# Patient Record
Sex: FEMALE | URBAN - METROPOLITAN AREA
[De-identification: names, ages, dates, MRNs, and addresses within clinical notes are randomized per-mention and may not be internally consistent; named-entity substitution may affect disease eponyms.]

---

## 2020-11-18 ENCOUNTER — APPOINTMENT (RX ONLY)
Dept: URBAN - METROPOLITAN AREA CLINIC 34 | Facility: CLINIC | Age: 35
Setting detail: DERMATOLOGY
End: 2020-11-18

## 2020-11-18 DIAGNOSIS — L71.8 OTHER ROSACEA: ICD-10-CM

## 2020-11-18 PROCEDURE — ? COUNSELING

## 2020-11-18 PROCEDURE — 99202 OFFICE O/P NEW SF 15 MIN: CPT

## 2020-11-18 PROCEDURE — ? TREATMENT REGIMEN

## 2020-11-18 PROCEDURE — ? ADDITIONAL NOTES

## 2020-11-18 PROCEDURE — ? PRESCRIPTION

## 2020-11-18 RX ORDER — IVERMECTIN 10 MG/G
CREAM TOPICAL
Qty: 1 | Refills: 2 | Status: ERX | COMMUNITY
Start: 2020-11-18

## 2020-11-18 RX ORDER — DOXYCYCLINE 40 MG/1
CAPSULE ORAL
Qty: 30 | Refills: 1 | Status: ERX | COMMUNITY
Start: 2020-11-18

## 2020-11-18 RX ORDER — AZELAIC ACID 0.15 G/G
GEL TOPICAL
Qty: 1 | Refills: 2 | Status: ERX | COMMUNITY
Start: 2020-11-18

## 2020-11-18 RX ADMIN — IVERMECTIN: 10 CREAM TOPICAL at 00:00

## 2020-11-18 RX ADMIN — DOXYCYCLINE: 40 CAPSULE ORAL at 00:00

## 2020-11-18 RX ADMIN — AZELAIC ACID: 0.15 GEL TOPICAL at 00:00

## 2020-11-18 ASSESSMENT — LOCATION SIMPLE DESCRIPTION DERM: LOCATION SIMPLE: RIGHT CHEEK

## 2020-11-18 ASSESSMENT — LOCATION ZONE DERM: LOCATION ZONE: FACE

## 2020-11-18 ASSESSMENT — LOCATION DETAILED DESCRIPTION DERM: LOCATION DETAILED: RIGHT INFERIOR CENTRAL MALAR CHEEK

## 2020-11-18 NOTE — PROCEDURE: ADDITIONAL NOTES
Detail Level: Simple
Additional Notes: Pt has been using Clinique wash and Cetaphil night time cream. CR discusses vanicream with the pt and recommends the pt use this since it is so good for sensitive skin.\\nPatient says she gets yeast infections when she takes higher doses of doxycycline, but not when she was on the lower dose doxy.There is a drug interaction between xarelto and fluconazole, so will prescribe Oracea.

## 2020-11-18 NOTE — HPI: RASH (ROSACEA)
How Severe Is Your Rosacea?: moderate
Is This A New Presentation, Or A Follow-Up?: Rosacea
Additional History: Patient notes a recent flare up in acne rosacea. She says this may be from new skin care products that she started using about 8 months ago. She notes her face is bumpy, painful, and red. She also notes excessive dryness and some scarring she would like to discuss.

## 2021-02-03 ENCOUNTER — APPOINTMENT (RX ONLY)
Dept: URBAN - METROPOLITAN AREA CLINIC 34 | Facility: CLINIC | Age: 36
Setting detail: DERMATOLOGY
End: 2021-02-03

## 2021-02-03 DIAGNOSIS — L71.8 OTHER ROSACEA: ICD-10-CM

## 2021-02-03 DIAGNOSIS — L85.3 XEROSIS CUTIS: ICD-10-CM

## 2021-02-03 PROCEDURE — ? ADDITIONAL NOTES

## 2021-02-03 PROCEDURE — ? COUNSELING

## 2021-02-03 PROCEDURE — ? TREATMENT REGIMEN

## 2021-02-03 PROCEDURE — 99213 OFFICE O/P EST LOW 20 MIN: CPT

## 2021-02-03 PROCEDURE — ? PRESCRIPTION MEDICATION MANAGEMENT

## 2021-02-03 RX ORDER — SULFACETAMIDE SODIUM, SULFUR 98; 48 MG/G; MG/G
LIQUID TOPICAL
Qty: 1 | Refills: 3 | Status: CANCELLED
Stop reason: ENTERED-IN-ERROR

## 2021-02-03 NOTE — PROCEDURE: TREATMENT REGIMEN
Detail Level: Zone
Plan: Dr. Priya Barney notes that good brands for rosacea patients include Elta-MD, Avene, [place] Rosacea Pose

## 2021-02-03 NOTE — PROCEDURE: ADDITIONAL NOTES
Additional Notes: Dr. Priya Barney notes that to achieve the results patient would like to see, rosacea triple cream can be used. Patient notes that her insurance rarely will cover medications [place] Pri[place] [facility] [place]ney notes the patient is a good candidate for IPL to improve redness. Dr. Priya Barney notes that IPL is not permanent and may need follow up treatments every couple years. Dr. Priya Barney notes to treat acne scarring, microneedling would be the most effective treatment. Dr. Priya Barney notes the different brands that patients with rosacea tolerated the best. Patient notes that they may be moving to [place] and Dr. Priya Barney notes that three months of treatments is normally standard. All patients questions and concerns were addressed. Additional Notes: Dr. Priya Barney notes that to achieve the results patient would like to see, rosacea triple cream can be used. Patient notes that her insurance rarely will cover medications and [place] cream, Dr. Priya Barney notes the price from [facility] [place]. [place]. Priya Barney notes the patient is a good candidate for IPL to improve redness. Dr. Priya Barney notes that IPL is not permanent and may need follow up treatments every couple years. Dr. Priya Barney notes to treat acne scarring, microneedling would be the most effective treatment. Dr. Priya Barney notes the different brands that patients with rosacea tolerated the best. Patient notes that they may be moving to [place] and Dr. Priya Barney notes that three months of treatments is normally standard. All patients questions and concerns were addressed.

## 2021-02-03 NOTE — PROCEDURE: PRESCRIPTION MEDICATION MANAGEMENT
Detail Level: Zone
Render In Strict Bullet Format?: No
Initiate Treatment: Rosacea triple cream- sent to [facility]

## 2021-05-14 ENCOUNTER — APPOINTMENT (RX ONLY)
Dept: URBAN - METROPOLITAN AREA CLINIC 41 | Facility: CLINIC | Age: 36
Setting detail: DERMATOLOGY
End: 2021-05-14

## 2021-05-14 DIAGNOSIS — L71.8 OTHER ROSACEA: ICD-10-CM | Status: INADEQUATELY CONTROLLED

## 2021-05-14 DIAGNOSIS — L65.9 NONSCARRING HAIR LOSS, UNSPECIFIED: ICD-10-CM | Status: INADEQUATELY CONTROLLED

## 2021-05-14 DIAGNOSIS — Z41.9 ENCOUNTER FOR PROCEDURE FOR PURPOSES OTHER THAN REMEDYING HEALTH STATE, UNSPECIFIED: ICD-10-CM

## 2021-05-14 PROCEDURE — ? COUNSELING

## 2021-05-14 PROCEDURE — 99214 OFFICE O/P EST MOD 30 MIN: CPT

## 2021-05-14 PROCEDURE — ? PRESCRIPTION

## 2021-05-14 PROCEDURE — ? TREATMENT REGIMEN

## 2021-05-14 PROCEDURE — ? COSMETIC CONSULTATION: LASER RESURFACING

## 2021-05-14 RX ORDER — DOXYCYCLINE 40 MG/1
CAPSULE ORAL
Qty: 32 | Refills: 2 | Status: ERX

## 2021-05-14 ASSESSMENT — LOCATION SIMPLE DESCRIPTION DERM
LOCATION SIMPLE: LEFT CHEEK
LOCATION SIMPLE: POSTERIOR SCALP
LOCATION SIMPLE: RIGHT CHEEK
LOCATION SIMPLE: RIGHT FOREHEAD

## 2021-05-14 ASSESSMENT — LOCATION ZONE DERM
LOCATION ZONE: FACE
LOCATION ZONE: SCALP

## 2021-05-14 ASSESSMENT — LOCATION DETAILED DESCRIPTION DERM
LOCATION DETAILED: POSTERIOR MID-PARIETAL SCALP
LOCATION DETAILED: RIGHT CENTRAL MALAR CHEEK
LOCATION DETAILED: LEFT INFERIOR MEDIAL MALAR CHEEK
LOCATION DETAILED: RIGHT INFERIOR MEDIAL FOREHEAD
LOCATION DETAILED: LEFT INFERIOR CENTRAL MALAR CHEEK

## 2021-05-14 NOTE — PROCEDURE: COSMETIC CONSULTATION: LASER RESURFACING
Detail Level: Detailed
Patient Specific Counseling (Will Not Stick From Patient To Patient): Cool peel $1000/tx, 3 tx recommended\\nVbeam full face $500, multiple treatments recommended\\n\\nPt notes she purchased a gift card for microneedling, EG notes the gift card is good for a year and can be applied to other treatments/products in the office.

## 2021-05-14 NOTE — HPI: COSMETIC (MICRONEEDLING)
Have You Had Microneedling Treatments Before?: has had a previous microneedling treatment
When Was Your Last Treatment?: 4/1/21

## 2021-05-14 NOTE — PROCEDURE: TREATMENT REGIMEN
Otc Regimen: Minoxidil 5%, proscriptix line qd
Continue Regimen: Multivitamin
Detail Level: Zone
Otc Regimen: R essentials microneedling kit, hydrating serum, light moisturizing cream, hydrating cream

## 2021-05-14 NOTE — HPI: RASH (ROSACEA)
How Severe Is Your Rosacea?: moderate
Is This A New Presentation, Or A Follow-Up?: Follow Up Rosacea
Additional History: Patient notes she is using a micro needling kit to help with this along with Oracea, notes she is having a lot of improvement

## 2021-05-14 NOTE — HPI: HAIR LOSS
How Severe Is Your Hair Loss?: moderate
Additional History: Patient had a hysterectomy in 3/2020. Patient notes she has also heard that xeralto can also cause hair loss, would like to discuss connection.